# Patient Record
Sex: FEMALE | URBAN - METROPOLITAN AREA
[De-identification: names, ages, dates, MRNs, and addresses within clinical notes are randomized per-mention and may not be internally consistent; named-entity substitution may affect disease eponyms.]

---

## 2019-10-18 ENCOUNTER — APPOINTMENT (RX ONLY)
Dept: URBAN - METROPOLITAN AREA CLINIC 12 | Facility: CLINIC | Age: 34
Setting detail: DERMATOLOGY
End: 2019-10-18

## 2019-10-18 PROCEDURE — ? VI PEEL

## 2019-10-18 NOTE — PROCEDURE: VI PEEL
Price $ (Use Numbers Only, No Text Please.): 375
Detail Level: Zone
Consent: Prior to the procedure, written consent was obtained and risks, benefits, expectations and alternatives were reviewed, including but not limited to: redness, peeling, blistering, pigmentary change, scarring, infection, and pain. The patient is aware multiple treatments may be necessary to achieve the desired outcome.
Prep: The treated area was degreased with pre-peel cleanser, and vaseline was applied for protection of mucous membranes.
Post Peel Care: After the procedure, the patient was instructed not to wash the treated area until the next morning or manually remove dead skin when the peeling process starts. The patient may use OTC hydrocortisone cream for itching. The patient was instructed to use the provided Retin-A wipes on the treated area on the first three nights following the day of her treatment.
Chemical Peel: VI Peel